# Patient Record
Sex: MALE | Race: BLACK OR AFRICAN AMERICAN | Employment: UNEMPLOYED | URBAN - METROPOLITAN AREA
[De-identification: names, ages, dates, MRNs, and addresses within clinical notes are randomized per-mention and may not be internally consistent; named-entity substitution may affect disease eponyms.]

---

## 2018-07-31 ENCOUNTER — HOSPITAL ENCOUNTER (EMERGENCY)
Facility: HOSPITAL | Age: 53
Discharge: HOME | End: 2018-07-31
Attending: PHYSICIAN ASSISTANT
Payer: MEDICAID

## 2018-07-31 ENCOUNTER — APPOINTMENT (OUTPATIENT)
Dept: GENERAL RADIOLOGY | Facility: HOSPITAL | Age: 53
End: 2018-07-31
Attending: PHYSICIAN ASSISTANT
Payer: MEDICAID

## 2018-07-31 VITALS
WEIGHT: 180 LBS | TEMPERATURE: 99 F | SYSTOLIC BLOOD PRESSURE: 116 MMHG | OXYGEN SATURATION: 97 % | DIASTOLIC BLOOD PRESSURE: 72 MMHG | HEART RATE: 73 BPM | RESPIRATION RATE: 14 BRPM

## 2018-07-31 DIAGNOSIS — J20.9 ACUTE BRONCHITIS, UNSPECIFIED ORGANISM: Primary | ICD-10-CM

## 2018-07-31 LAB — S PYO AG THROAT QL: NEGATIVE

## 2018-07-31 PROCEDURE — 87430 STREP A AG IA: CPT

## 2018-07-31 PROCEDURE — 99283 EMERGENCY DEPT VISIT LOW MDM: CPT

## 2018-07-31 PROCEDURE — 71046 X-RAY EXAM CHEST 2 VIEWS: CPT | Performed by: PHYSICIAN ASSISTANT

## 2018-07-31 RX ORDER — AZITHROMYCIN 250 MG/1
TABLET, FILM COATED ORAL
Qty: 1 PACKAGE | Refills: 0 | Status: SHIPPED | OUTPATIENT
Start: 2018-07-31 | End: 2018-08-05

## 2018-07-31 RX ORDER — PREDNISONE 20 MG/1
40 TABLET ORAL
Qty: 10 TABLET | Refills: 0 | Status: SHIPPED | OUTPATIENT
Start: 2018-07-31 | End: 2018-08-05

## 2018-07-31 RX ORDER — ALBUTEROL SULFATE 90 UG/1
2 AEROSOL, METERED RESPIRATORY (INHALATION) EVERY 4 HOURS PRN
Qty: 1 INHALER | Refills: 0 | Status: SHIPPED | OUTPATIENT
Start: 2018-07-31 | End: 2018-08-30

## 2018-07-31 NOTE — CM/SW NOTE
Met with patient to answer questions regarding PCP and pulmonologist.  Pt stated know his PCP and already has appt tomorrow. Informed pt to keep appt pt v/u.   Pt asked about seeing pulmonary specialist, informed pcp will let him know tomorrow in his visit

## 2018-07-31 NOTE — ED PROVIDER NOTES
Patient Seen in: [facility] AND [facility] Emergency Department    History   Patient presents with:  Cough/URI  Fever (infectious)    Stated Complaint: cough, fever, chills, sob, body aches    HPI       16-year-old male presents with chief complaint of nonproduc [07/31/18 1716]  BP: 96/74  Pulse: 87  Resp: 20  Temp: 98.7 °F (37.1 °C)  Temp src: Temporal  SpO2: 97 %  O2 Device: None (Room air)    Current:/72   Pulse 73   Temp 98.7 °F (37.1 °C) (Temporal)   Resp 14   Wt 81.6 kg   SpO2 97%      PULSE OX within Bilateral peribronchial thickening/cuffing. Findings suggest sequela of chronic smoking with mild/early COPD and probable bronchitis. 2. Findings could reflect acute on chronic bronchitis. Correlate clinically.  3. Otherwise, no acute cardiopulmonary diseas

## 2018-07-31 NOTE — ED INITIAL ASSESSMENT (HPI)
Triage:  Patient c/o cough/cold symptoms, body aches and fever since landing on Friday evening. Patient recently in [place].

## 2020-06-28 ENCOUNTER — HOSPITAL ENCOUNTER (EMERGENCY)
Facility: HOSPITAL | Age: 55
Discharge: HOME | End: 2020-06-28
Attending: EMERGENCY MEDICINE
Payer: MEDICAID

## 2020-06-28 VITALS
SYSTOLIC BLOOD PRESSURE: 118 MMHG | OXYGEN SATURATION: 96 % | WEIGHT: 185 LBS | RESPIRATION RATE: 16 BRPM | DIASTOLIC BLOOD PRESSURE: 72 MMHG | HEART RATE: 80 BPM | TEMPERATURE: 98 F

## 2020-06-28 DIAGNOSIS — L02.01 FACIAL ABSCESS: Primary | ICD-10-CM

## 2020-06-28 PROCEDURE — 10160 PNXR ASPIR ABSC HMTMA BULLA: CPT

## 2020-06-28 PROCEDURE — 87205 SMEAR GRAM STAIN: CPT | Performed by: EMERGENCY MEDICINE

## 2020-06-28 PROCEDURE — 99283 EMERGENCY DEPT VISIT LOW MDM: CPT

## 2020-06-28 PROCEDURE — 87070 CULTURE OTHR SPECIMN AEROBIC: CPT | Performed by: EMERGENCY MEDICINE

## 2020-06-28 RX ORDER — IBUPROFEN 600 MG/1
600 TABLET ORAL ONCE
Status: COMPLETED | OUTPATIENT
Start: 2020-06-28 | End: 2020-06-28

## 2020-06-28 RX ORDER — POLYMYXIN B SULFATE AND TRIMETHOPRIM 1; 10000 MG/ML; [USP'U]/ML
1 SOLUTION OPHTHALMIC 4 TIMES DAILY
Qty: 10 ML | Refills: 0 | Status: SHIPPED | OUTPATIENT
Start: 2020-06-28 | End: 2020-07-03

## 2020-06-28 RX ORDER — CLINDAMYCIN HYDROCHLORIDE 300 MG/1
300 CAPSULE ORAL 3 TIMES DAILY
Qty: 30 CAPSULE | Refills: 0 | Status: SHIPPED | OUTPATIENT
Start: 2020-06-28 | End: 2020-07-08

## 2020-06-28 NOTE — ED PROVIDER NOTES
Patient Seen in: [facility] AND [facility] Emergency Department      History   Patient presents with:  Cyst    Stated Complaint:     HPI    57-year-old male presents for evaluation of bump to right eyebrow.   Patient reports progressively worsening bump to his ri supple. No neck rigidity. Cardiovascular:      Rate and Rhythm: Normal rate. Pulmonary:      Effort: Pulmonary effort is normal. No respiratory distress. Musculoskeletal: Normal range of motion. Skin:     General: Skin is warm.       Capillary Refil HCl 300 MG Oral Cap  Take 1 capsule (300 mg total) by mouth 3 (three) times daily for 10 days. , Donovan Disp-30 capsule, R-0    Polymyxin B-Trimethoprim 11093-1.1 UNIT/ML-% Ophthalmic Solution  Apply 1 drop to eye 4 (four) times daily for 5 days. , Tawnya Man

## 2025-06-06 ENCOUNTER — HOSPITAL ENCOUNTER (EMERGENCY)
Facility: HOSPITAL | Age: 60
Discharge: HOME | End: 2025-06-07
Attending: EMERGENCY MEDICINE
Payer: MEDICAID

## 2025-06-06 VITALS
TEMPERATURE: 97 F | SYSTOLIC BLOOD PRESSURE: 129 MMHG | HEART RATE: 75 BPM | BODY MASS INDEX: 21.6 KG/M2 | WEIGHT: 163 LBS | HEIGHT: 73 IN | OXYGEN SATURATION: 97 % | RESPIRATION RATE: 18 BRPM | DIASTOLIC BLOOD PRESSURE: 88 MMHG

## 2025-06-06 DIAGNOSIS — L03.011 PARONYCHIA OF FINGER OF RIGHT HAND: Primary | ICD-10-CM

## 2025-06-06 PROCEDURE — 99284 EMERGENCY DEPT VISIT MOD MDM: CPT

## 2025-06-06 PROCEDURE — 99283 EMERGENCY DEPT VISIT LOW MDM: CPT

## 2025-06-06 PROCEDURE — 26010 DRAINAGE OF FINGER ABSCESS: CPT

## 2025-06-06 RX ORDER — DOXYCYCLINE 100 MG/1
100 CAPSULE ORAL 2 TIMES DAILY
Qty: 14 CAPSULE | Refills: 0 | Status: SHIPPED | OUTPATIENT
Start: 2025-06-06 | End: 2025-06-13

## 2025-06-06 RX ORDER — DOXYCYCLINE 100 MG/1
100 CAPSULE ORAL ONCE
Status: COMPLETED | OUTPATIENT
Start: 2025-06-06 | End: 2025-06-06

## 2025-06-06 RX ORDER — LIDOCAINE HYDROCHLORIDE 10 MG/ML
20 INJECTION, SOLUTION EPIDURAL; INFILTRATION; INTRACAUDAL; PERINEURAL ONCE
Status: COMPLETED | OUTPATIENT
Start: 2025-06-06 | End: 2025-06-06

## 2025-06-07 RX ORDER — HYDROCODONE BITARTRATE AND ACETAMINOPHEN 5; 325 MG/1; MG/1
1 TABLET ORAL ONCE
Refills: 0 | Status: COMPLETED | OUTPATIENT
Start: 2025-06-07 | End: 2025-06-07

## 2025-06-07 RX ORDER — HYDROCODONE BITARTRATE AND ACETAMINOPHEN 5; 325 MG/1; MG/1
1 TABLET ORAL EVERY 6 HOURS PRN
Qty: 5 TABLET | Refills: 0 | Status: SHIPPED | OUTPATIENT
Start: 2025-06-07

## 2025-06-07 NOTE — ED PROVIDER NOTES
Patient Seen in: Health system Emergency Department    History     Chief Complaint   Patient presents with    Finger Pain       HPI    59-year-old male who states that he picks at his nails quite a bit and bites nails, here with second day of right finger distal pad swelling and pain around the nail extending to the pad.  Denies any fevers.  No drainage    History reviewed. Past Medical History[1]    History reviewed. Past Surgical History[2]      Medications :  Prescriptions Prior to Admission[3]     Family History[4]    Smoking Status: Social Hx on file[5]    Constitutional and vital signs reviewed.      Social History and Family History elements reviewed from today, pertinent positives to the presenting problem noted.    Physical Exam     ED Triage Vitals [06/06/25 2158]   /75   Pulse 106   Resp 18   Temp 97 °F (36.1 °C)   Temp src Temporal   SpO2 98 %   O2 Device None (Room air)       All measures to prevent infection transmission during my interaction with the patient were taken. The patient was already wearing a droplet mask on my arrival to the room. Personal protective equipment was worn throughout the duration of the exam.  Handwashing was performed prior to and after the exam.  Stethoscope and any equipment used during my examination was cleaned with super sani-cloth germicidal wipes following the exam.     Physical Exam    General: NAD  Head: Normocephalic and atraumatic.  Mouth/Throat/Ears/Nose: No hoarseness of voice  Eyes: Conjunctivae and EOM are normal.  Neck: Normal range of motion. Supple.   Cardiovascular: Normal rate  Respiratory/Chest: No tachypnea.  Gastrointestinal: Nondistended  Musculoskeletal:No  deformity.  There is swelling and erythema at the ulnar border of the eponychia with extension of the swelling to the volar distal pad of the right third digit.  Very minimal fluctuance and mostly induration.  Less than 2-second capillary refill, normal A-OK and thumbs up signs.  There  is no erythema or tenderness more proximal than the distal phalanx  Neurological: Alert and appropriate.   Skin: Skin is warm and dry. No pallor.  Psychiatric: Has a normal mood and affect.      ED Course      Labs Reviewed - No data to display    As Interpreted by me    Imaging Results Available and Reviewed while in ED: No results found.  ED Medications Administered:   Medications   lidocaine PF (Xylocaine-MPF) 1% injection (20 mL Infiltration Given by Other 6/6/25 2253)   doxycycline (Vibramycin) cap 100 mg (100 mg Oral Given 6/6/25 2353)   HYDROcodone-acetaminophen (Norco) 5-325 MG per tab 1 tablet (1 tablet Oral Given 6/7/25 0004)         MDM     Vitals:    06/06/25 2158 06/06/25 2215 06/06/25 2300 06/06/25 2345   BP: 136/75  (!) 130/91 129/88   Pulse: 106 89 78 75   Resp: 18 18 16 18   Temp: 97 °F (36.1 °C)      TempSrc: Temporal      SpO2: 98% 98% 98% 97%   Weight: 73.9 kg      Height: 185.4 cm (6' 1\")        *I personally reviewed and interpreted all ED vitals.    Pulse Ox: 98%, Room air, Normal       Medical Decision Making      Differential Diagnosis/ Diagnostic Considerations: Paronychia    Complicating Factors: The patient already has does not have a problem list on file. to contribute to the complexity of this ED evaluation.    I reviewed prior chart records including office note from April 16, 2025.  Patient here with paronychia, possibility of underlying abscess and thus I&D was performed thankfully there was not any pus.  Recommended warm soaks, topical antibiotic and p.o. antibiotics.     Dc In stable condition.  Patient is comfortable with the plan.    Prescriptions: As below    Incision and drainage:  Indication: paronychia with possible abscess  Consent: obtained after discussion of risks, benefits and alternatives   Preparation: sterilized  Anesthesia: Lidocaine 1% without epi , 5 ml, digital nerve block  Procedure: 11 blade used to make linear incision approximately 1cm in length and wound  probed with hemostat without expression of pus  Complications: None           Disposition and Plan     Clinical Impression:  1. Paronychia of finger of right hand        Disposition:  Discharge    Follow-up:  Ryder Ugarte Jr.  [address]  [address]  [facility] [id number] [phone]    Follow up in 2 day(s)        Medications Prescribed:  Discharge Medication List as of 6/7/2025 12:09 AM        START taking these medications    Details   HYDROcodone-acetaminophen 5-325 MG Oral Tab Take 1 tablet by mouth every 6 (six) hours as needed., Normal, Disp-5 tablet, R-0      doxycycline 100 MG Oral Cap Take 1 capsule (100 mg total) by mouth 2 (two) times daily for 7 days., Normal, Disp-14 capsule, R-0                              [1] History reviewed. No pertinent past medical history.  [2]   Past Surgical History:  Procedure Laterality Date    Anesth,surgery of shoulder      Hip replacement surgery     [3] (Not in a hospital admission)   [4] History reviewed. No pertinent family history.  [5]   Social History  Socioeconomic History    Marital status:    Tobacco Use    Smoking status: Every Day    Smokeless tobacco: Never

## 2025-06-07 NOTE — DISCHARGE INSTRUCTIONS
Please start warm soaks x30min x 4 times a day; over the counter topical antibiotic ointment three times a day. Please avoid picking at fingers/nails  Return if worsening symptoms

## 2025-06-07 NOTE — ED INITIAL ASSESSMENT (HPI)
Pt ambulatory to ED for complaint of right 3rd finger pain and swelling. Pt states the swelling started yesterday, but today it has almost doubled in size and is very painful. 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

## (undated) NOTE — ED AVS SNAPSHOT
Rodrick Walsh. MRN: [id number]    Department:  [facility] Emergency Department   Date of Visit:  7/31/2018           Disclosure     Insurance plans vary and the physician(s) referred by the ER may not be covered by your plan.  Please c CARE PHYSICIAN AT ONCE OR RETURN IMMEDIATELY TO THE EMERGENCY DEPARTMENT. If you have been prescribed any medication(s), please fill your prescription right away and begin taking the medication(s) as directed.   If you believe that any of the medications